# Patient Record
Sex: MALE | Race: OTHER | HISPANIC OR LATINO
[De-identification: names, ages, dates, MRNs, and addresses within clinical notes are randomized per-mention and may not be internally consistent; named-entity substitution may affect disease eponyms.]

---

## 2017-06-21 PROBLEM — Z00.00 ENCOUNTER FOR PREVENTIVE HEALTH EXAMINATION: Status: ACTIVE | Noted: 2017-06-21

## 2017-06-28 ENCOUNTER — APPOINTMENT (OUTPATIENT)
Dept: OPHTHALMOLOGY | Facility: CLINIC | Age: 72
End: 2017-06-28

## 2017-06-28 ENCOUNTER — OUTPATIENT (OUTPATIENT)
Dept: OUTPATIENT SERVICES | Facility: HOSPITAL | Age: 72
LOS: 1 days | End: 2017-06-28

## 2017-06-30 DIAGNOSIS — E11.3513 TYPE 2 DIABETES MELLITUS WITH PROLIFERATIVE DIABETIC RETINOPATHY WITH MACULAR EDEMA, BILATERAL: ICD-10-CM

## 2022-02-25 VITALS
HEIGHT: 69 IN | SYSTOLIC BLOOD PRESSURE: 176 MMHG | RESPIRATION RATE: 16 BRPM | OXYGEN SATURATION: 98 % | DIASTOLIC BLOOD PRESSURE: 81 MMHG | HEART RATE: 63 BPM | WEIGHT: 179.9 LBS | TEMPERATURE: 98 F

## 2022-02-25 RX ORDER — CHLORHEXIDINE GLUCONATE 213 G/1000ML
1 SOLUTION TOPICAL ONCE
Refills: 0 | Status: DISCONTINUED | OUTPATIENT
Start: 2022-03-03 | End: 2022-03-04

## 2022-02-25 NOTE — H&P ADULT - [facility] PE MLT D E PC
regular rate and rhythm/no murmur +MARCELLUS auscultated best at 2nd R intercostal space, III/XI/regular rate and rhythm

## 2022-02-25 NOTE — H&P ADULT - HISTORY OF PRESENT ILLNESS
COVID:  Pharmacy:  Escort:    76Y M, former smoker, w/ FHx of premature CAD/MI, and PMHx HTN, HLD, HFrEF (EF 30-35%), s/p PPM and DM-II, initially presented to outpt cardiologist Dr. Pickett c/o DUNN w/ 2-3 blocks that improves w/ rest and w/ associated LE edema. Pt also endorse brief episodes of palpitations and dizziness. He denies any ___ CP, syncope, diaphoresis, fatigue, orthopnea, PND, N/V, fever, chills or recent sick contact.     Echo (2/15/22): concentric LVH and dilatation, akinesis of apex, severe hypokinesis of mid-distal anterior septum and mod-severe global systolic dysfunction, LVEF 30-35%, mod LA dilatation, mild-mod MR, mild TR. Carotid US (__): patent carotid arteries B/L, left common carotid and bulb plaques noted, antegrade flow in B/L vertebral arteries. Aortic Duplex (__): mild to mod calcified plaque throughout aorta and common iliac, however patent and w/o stenosis, aneurysm or dissection. PVR (__): decreased left MAHOGANY, equivocal right MAHOGANY. EKG (__): SR, Qwave V1-4.    In light of pts risk factors, CCS class __ anginal symptoms and abnormal ____, pt now presents to [facility] for recommended cardiac catheterization with possible intervention if clinically indicated.  COVID:  Pharmacy:  Escort:    76Y M, former smoker, w/ FHx of premature CAD/MI, and PMHx HTN, HLD, HFrEF (EF 30-35%), s/p PPM and DM-II, initially presented to outpt cardiologist Dr. Pickett c/o DUNN w/ 2-3 blocks that improves w/ rest and w/ associated LE edema. Pt also endorse brief episodes of palpitations and dizziness. He denies any ___ CP, syncope, diaphoresis, fatigue, orthopnea, PND, N/V, fever, chills or recent sick contact.     Echo (2/15/22): LVEF 30-35% w/ akinesis of apex, severe hypokinesis of mid-distal anterior septum and mod-severe global dysfunction, mod LA dilatation, mild-mod MR, mild TR. Exercise Stress Echo (2/23/22): perinfarct ischemia at uriel-septal wall, LVEF 35% at rest and LVEF 38% at stress. EKG (2/4/22): SR, Qwave V1-4. Carotid US (per MD note): patent carotid arteries B/L, left common carotid and bulb plaques noted, antegrade flow in B/L vertebral arteries. Aortic Duplex (per MD note): mild to mod calcified plaque throughout aorta and common iliac, however patent and w/o stenosis, aneurysm or dissection. PVR (per MD note): decreased left MAHOGANY, equivocal right MAHOGANY.    In light of pts risk factors, CCS class __ anginal symptoms and abnormal stress echo, pt now presents to [facility] for recommended cardiac catheterization with possible intervention if clinically indicated.  COVID: 2/28/22- unsure of location- says his VICKI knows where they went   Pharmacy: [facility]   Escort: [facility] [phone]. (Pt's cell [phone])    Verify Meds    76Y Swazi speaking M, former smoker and PMHx HTN, HLD, HFrEF (EF 30-35%), s/p PPM, ?CVA in 2014 (residual R foot weakness), DM-II, initially presented to outpt cardiologist Dr. Pickett c/o DUNN w/ 2-3 blocks that improves w/ rest and w/ associated LE edema for the past 6 years. Pt also endorse brief episodes of palpitations and dizziness. He denies any CP, syncope, diaphoresis, fatigue, orthopnea, PND, N/V, fever, chills or recent sick contact. Echo (2/15/22): LVEF 30-35% w/ akinesis of apex, severe hypokinesis of mid-distal anterior septum and mod-severe global dysfunction, mod LA dilatation, mild-mod MR, mild TR. Exercise Stress Echo (2/23/22): perinfarct ischemia at uriel-septal wall, LVEF 35% at rest and LVEF 38% at stress. EKG (2/4/22): SR, Qwave V1-4. Carotid US (per MD note): patent carotid arteries B/L, left common carotid and bulb plaques noted, antegrade flow in B/L vertebral arteries. Aortic Duplex (per MD note): mild to mod calcified plaque throughout aorta and common iliac, however patent and w/o stenosis, aneurysm or dissection. PVR (per MD note): decreased left MAHOGANY, equivocal right MAHOGANY. In light of pts risk factors, CCS class II anginal symptoms and abnormal stress echo, pt now presents to [facility] for recommended cardiac catheterization with possible intervention if clinically indicated.  COVID: 2/28/22- negative (printed, filed in chart)  Pharmacy: [facility]   Escort: [facility] [phone]. (Pt's cell [phone])    76Y Chinese speaking M, former smoker and PMHx HTN, HLD, HFrEF (EF 30-35%), s/p PPM, ?CVA in 2014 (residual R foot weakness), DM-II, initially presented to outpt cardiologist Dr. Pickett c/o DUNN w/ 2-3 blocks that improves w/ rest and w/ associated LE edema for the past 6 years. Pt also endorse brief episodes of palpitations and dizziness. He denies any CP, syncope, diaphoresis, fatigue, orthopnea, PND, N/V, fever, chills or recent sick contact. Echo (2/15/22): LVEF 30-35% w/ akinesis of apex, severe hypokinesis of mid-distal anterior septum and mod-severe global dysfunction, mod LA dilatation, mild-mod MR, mild TR. Exercise Stress Echo (2/23/22): perinfarct ischemia at uriel-septal wall, LVEF 35% at rest and LVEF 38% at stress. EKG (2/4/22): SR, Qwave V1-4. Carotid US (per MD note): patent carotid arteries B/L, left common carotid and bulb plaques noted, antegrade flow in B/L vertebral arteries. Aortic Duplex (per MD note): mild to mod calcified plaque throughout aorta and common iliac, however patent and w/o stenosis, aneurysm or dissection. PVR (per MD note): decreased left MAHOGANY, equivocal right MAHOGANY. In light of pts risk factors, CCS class II anginal symptoms and abnormal stress echo, pt now presents to [facility] for recommended cardiac catheterization with possible intervention if clinically indicated.  COVID: 2/28/22- negative (printed, filed in chart)  Pharmacy: [facility]. Medications verified by patient's outpatient pharmacy and cross-referenced by [employer].   Escort: [facility] [phone]. (Pt's cell [phone])    76Y Ukrainian speaking M, former smoker and PMHx HTN, HLD, HFrEF (EF 30-35%), s/p PPM, ?CVA in 2014 (residual R foot weakness), DM-II, initially presented to outpt cardiologist Dr. Pickett c/o DUNN w/ 2-3 blocks that improves w/ rest and w/ associated LE edema for the past 6 years. Pt also endorse brief episodes of palpitations and dizziness. He denies any CP, syncope, diaphoresis, fatigue, orthopnea, PND, N/V, fever, chills or recent sick contact. Echo (2/15/22): LVEF 30-35% w/ akinesis of apex, severe hypokinesis of mid-distal anterior septum and mod-severe global dysfunction, mod LA dilatation, mild-mod MR, mild TR. Exercise Stress Echo (2/23/22): perinfarct ischemia at uriel-septal wall, LVEF 35% at rest and LVEF 38% at stress. EKG (2/4/22): SR, Qwave V1-4. Carotid US (per MD note): patent carotid arteries B/L, left common carotid and bulb plaques noted, antegrade flow in B/L vertebral arteries. Aortic Duplex (per MD note): mild to mod calcified plaque throughout aorta and common iliac, however patent and w/o stenosis, aneurysm or dissection. PVR (per MD note): decreased left MAHOGANY, equivocal right MAHOGANY. In light of pts risk factors, CCS class II anginal symptoms and abnormal stress echo, pt now presents to [facility] for recommended cardiac catheterization with possible intervention if clinically indicated.

## 2022-02-25 NOTE — H&P ADULT - NSHPLABSRESULTS_GEN_ALL_CORE
EKG: 12.1   5.06  )-----------( 243      ( 03 Mar 2022 08:23 )             36.2       03-03    139  |  103  |  17  ----------------------------<  133<H>  3.6   |  24  |  1.29    Ca    9.5      03 Mar 2022 08:23        PT/INR - ( 03 Mar 2022 08:23 )   PT: 10.5 sec;   INR: 0.88          PTT - ( 03 Mar 2022 08:23 )  PTT:29.0 sec              EKG: 12.1   5.06  )-----------( 243      ( 03 Mar 2022 08:23 )             36.2       03-03    139  |  103  |  17  ----------------------------<  133<H>  3.6   |  24  |  1.29    Ca    9.5      03 Mar 2022 08:23        PT/INR - ( 03 Mar 2022 08:23 )   PT: 10.5 sec;   INR: 0.88          PTT - ( 03 Mar 2022 08:23 )  PTT:29.0 sec        ECG 3/3/22: NSR @ 64 BPM with 1st degree AV block w/ LAD, LAFB, poor R wave progression, septal Q waves        EKG:

## 2022-02-25 NOTE — H&P ADULT - NSICDXPASTMEDICALHX_GEN_ALL_CORE_FT
PAST MEDICAL HISTORY:  Diabetes mellitus     H/O: CVA (cerebrovascular accident)     Hyperlipidemia     Hypertension

## 2022-02-25 NOTE — H&P ADULT - ASSESSMENT
76Y Hong Konger speaking M, former smoker and PMHx HTN, HLD, HFrEF (EF 30-35%), s/p PPM, ?CVA in 2014 (residual R foot weakness), DM-II, initially presented to outpt cardiologist Dr. Pickett c/o DUNN w/ 2-3 blocks that improves w/ rest and w/ associated LE edema for the past 6 years. Pt also endorse brief episodes of palpitations and dizziness.  In light of pts risk factors, CCS class II anginal symptoms, abnormal stress echo, pt now presents to [facility] for recommended cardiac catheterization with possible intervention if clinically indicated.     ECHO revealing LVEF 30-35%.     ASA Class III    Mallampati Class III    Risks & benefits of procedure and alternative therapy have been explained to the patient including but not limited to: allergic reaction, bleeding with possible need for blood transfusion, infection, renal and vascular compromise, limb damage, arrhythmia, stroke, vessel dissection/perforation, Myocardial infarction, emergent CABG. Informed consent obtained and in chart.       Patient a candidate for sedation: Yes    Sedation Type: Moderate 76Y Gibraltarian speaking M, former smoker and PMHx HTN, HLD, HFrEF (EF 30-35%), s/p PPM, ?CVA in 2014 (residual R foot weakness), DM-II, initially presented to outpt cardiologist Dr. Pickett c/o DUNN w/ 2-3 blocks that improves w/ rest and w/ associated LE edema for the past 6 years. Pt also endorse brief episodes of palpitations and dizziness.  In light of pts risk factors, CCS class II anginal symptoms, abnormal stress echo, pt now presents to [facility] for recommended cardiac catheterization with possible intervention if clinically indicated.     ECHO revealing LVEF 30-35%.  Trace edema to B/L ankles. Saturating well on RA. Lungs CTA.  No pre-hydration fluids.     VERY POOR MEDICATION COMPLIANCE. Does not take ASA.  Takes Brilinta 60 mg PO BID per surescripts. Pt unsure.  Did not take any medication today. Will provide Ecotrin 325 mg PO x1 & Plavix 600 mg PO x1 pre-cath.     ASA Class III    Mallampati Class III    Risks & benefits of procedure and alternative therapy have been explained to the patient including but not limited to: allergic reaction, bleeding with possible need for blood transfusion, infection, renal and vascular compromise, limb damage, arrhythmia, stroke, vessel dissection/perforation, Myocardial infarction, emergent CABG. Informed consent obtained and in chart.       Patient a candidate for sedation: Yes    Sedation Type: Moderate 76Y Romanian speaking M, former smoker and PMHx HTN, HLD, HFrEF (EF 30-35%), s/p PPM, ?CVA in 2014 (residual R foot weakness), DM-II, initially presented to outpt cardiologist Dr. Pickett c/o DUNN w/ 2-3 blocks that improves w/ rest and w/ associated LE edema for the past 6 years. Pt also endorse brief episodes of palpitations and dizziness.  In light of pts risk factors, CCS class II anginal symptoms, abnormal stress echo, pt now presents to [facility] for recommended cardiac catheterization with possible intervention if clinically indicated.     ECHO revealing LVEF 30-35%.  Trace edema to B/L ankles. Saturating well on RA. Lungs CTA.  NS @ 50 cc/hr as d/w D. [place]. Will hydrate post-cath pending EDP.    VERY POOR MEDICATION COMPLIANCE. Does not take ASA.  Takes Brilinta 60 mg PO BID per [employer]. Pt unsure about meds.  Did not take any medication today. Will provide Ecotrin 325 mg PO x1 & Plavix 600 mg PO x1 pre-cath.     Previously prescribed insulin but patient states he has not taken it for >1 month and unable to explain why.     ASA Class III    Mallampati Class III    Risks & benefits of procedure and alternative therapy have been explained to the patient including but not limited to: allergic reaction, bleeding with possible need for blood transfusion, infection, renal and vascular compromise, limb damage, arrhythmia, stroke, vessel dissection/perforation, Myocardial infarction, emergent CABG. Informed consent obtained and in chart.       Patient a candidate for sedation: Yes    Sedation Type: Moderate 76Y Polish speaking M, former smoker and PMHx HTN, HLD, HFrEF (EF 30-35%), s/p PPM, ?CVA in 2014 (residual R foot weakness), DM-II, initially presented to outpt cardiologist Dr. Pickett c/o DUNN w/ 2-3 blocks that improves w/ rest and w/ associated LE edema for the past 6 years. Pt also endorse brief episodes of palpitations and dizziness.  In light of pts risk factors, CCS class II anginal symptoms, abnormal stress echo, pt now presents to [facility] for recommended cardiac catheterization with possible intervention if clinically indicated.     ECHO revealing LVEF 30-35%.  Trace edema to B/L ankles. Saturating well on RA. Lungs CTA.  NS @ 50 cc/hr as d/w D. [place]. Will hydrate post-cath pending EDP.    VERY POOR MEDICATION COMPLIANCE. Does not take ASA.  Takes Brilinta 60 mg PO BID per Sures[employer]s. Pt unsure about meds.  Did not take any medication today. Will provide Ecotrin 325 mg PO x1 & Plavix 600 mg PO x1 pre-cath 2/2 concern for medication on-compliance.     Previously prescribed insulin but patient states he has not taken it for >1 month and unable to explain why.     ASA Class III    Mallampati Class III    Risks & benefits of procedure and alternative therapy have been explained to the patient including but not limited to: allergic reaction, bleeding with possible need for blood transfusion, infection, renal and vascular compromise, limb damage, arrhythmia, stroke, vessel dissection/perforation, Myocardial infarction, emergent CABG. Informed consent obtained and in chart.       Patient a candidate for sedation: Yes    Sedation Type: Moderate

## 2022-03-03 ENCOUNTER — TRANSCRIPTION ENCOUNTER (OUTPATIENT)
Age: 77
End: 2022-03-03

## 2022-03-03 ENCOUNTER — INPATIENT (INPATIENT)
Facility: HOSPITAL | Age: 77
LOS: 0 days | Discharge: HOME | DRG: 247 | End: 2022-03-04
Attending: INTERNAL MEDICINE | Admitting: INTERNAL MEDICINE
Payer: MEDICARE

## 2022-03-03 DIAGNOSIS — Z95.0 PRESENCE OF CARDIAC PACEMAKER: Chronic | ICD-10-CM

## 2022-03-03 LAB
A1C WITH ESTIMATED AVERAGE GLUCOSE RESULT: 8.6 % — HIGH (ref 4–5.6)
ALBUMIN SERPL ELPH-MCNC: 4.1 G/DL — SIGNIFICANT CHANGE UP (ref 3.3–5)
ALP SERPL-CCNC: 67 U/L — SIGNIFICANT CHANGE UP (ref 40–120)
ALT FLD-CCNC: 15 U/L — SIGNIFICANT CHANGE UP (ref 10–45)
ANION GAP SERPL CALC-SCNC: 12 MMOL/L — SIGNIFICANT CHANGE UP (ref 5–17)
APTT BLD: 29 SEC — SIGNIFICANT CHANGE UP (ref 27.5–35.5)
AST SERPL-CCNC: 19 U/L — SIGNIFICANT CHANGE UP (ref 10–40)
BASOPHILS # BLD AUTO: 0.03 K/UL — SIGNIFICANT CHANGE UP (ref 0–0.2)
BASOPHILS NFR BLD AUTO: 0.6 % — SIGNIFICANT CHANGE UP (ref 0–2)
BILIRUB SERPL-MCNC: 0.4 MG/DL — SIGNIFICANT CHANGE UP (ref 0.2–1.2)
BUN SERPL-MCNC: 17 MG/DL — SIGNIFICANT CHANGE UP (ref 7–23)
CALCIUM SERPL-MCNC: 9.5 MG/DL — SIGNIFICANT CHANGE UP (ref 8.4–10.5)
CHLORIDE SERPL-SCNC: 103 MMOL/L — SIGNIFICANT CHANGE UP (ref 96–108)
CHOLEST SERPL-MCNC: 271 MG/DL — HIGH
CK MB CFR SERPL CALC: 3.3 NG/ML — SIGNIFICANT CHANGE UP (ref 0–6.7)
CK SERPL-CCNC: 154 U/L — SIGNIFICANT CHANGE UP (ref 30–200)
CO2 SERPL-SCNC: 24 MMOL/L — SIGNIFICANT CHANGE UP (ref 22–31)
CREAT SERPL-MCNC: 1.29 MG/DL — SIGNIFICANT CHANGE UP (ref 0.5–1.3)
EGFR: 58 ML/MIN/1.73M2 — LOW
EOSINOPHIL # BLD AUTO: 0 K/UL — SIGNIFICANT CHANGE UP (ref 0–0.5)
EOSINOPHIL NFR BLD AUTO: 0 % — SIGNIFICANT CHANGE UP (ref 0–6)
ESTIMATED AVERAGE GLUCOSE: 200 MG/DL — HIGH (ref 68–114)
GLUCOSE BLDC GLUCOMTR-MCNC: 111 MG/DL — HIGH (ref 70–99)
GLUCOSE BLDC GLUCOMTR-MCNC: 123 MG/DL — HIGH (ref 70–99)
GLUCOSE BLDC GLUCOMTR-MCNC: 125 MG/DL — HIGH (ref 70–99)
GLUCOSE BLDC GLUCOMTR-MCNC: 130 MG/DL — HIGH (ref 70–99)
GLUCOSE BLDC GLUCOMTR-MCNC: 140 MG/DL — HIGH (ref 70–99)
GLUCOSE BLDC GLUCOMTR-MCNC: 141 MG/DL — HIGH (ref 70–99)
GLUCOSE BLDC GLUCOMTR-MCNC: 252 MG/DL — HIGH (ref 70–99)
GLUCOSE SERPL-MCNC: 133 MG/DL — HIGH (ref 70–99)
HCT VFR BLD CALC: 36.2 % — LOW (ref 39–50)
HCV AB S/CO SERPL IA: 0.04 S/CO — SIGNIFICANT CHANGE UP
HCV AB SERPL-IMP: SIGNIFICANT CHANGE UP
HDLC SERPL-MCNC: 49 MG/DL — SIGNIFICANT CHANGE UP
HGB BLD-MCNC: 12.1 G/DL — LOW (ref 13–17)
IMM GRANULOCYTES NFR BLD AUTO: 0.2 % — SIGNIFICANT CHANGE UP (ref 0–1.5)
INR BLD: 0.88 — SIGNIFICANT CHANGE UP (ref 0.88–1.16)
LIPID PNL WITH DIRECT LDL SERPL: 188 MG/DL — HIGH
LYMPHOCYTES # BLD AUTO: 1.64 K/UL — SIGNIFICANT CHANGE UP (ref 1–3.3)
LYMPHOCYTES # BLD AUTO: 32.4 % — SIGNIFICANT CHANGE UP (ref 13–44)
MCHC RBC-ENTMCNC: 32.7 PG — SIGNIFICANT CHANGE UP (ref 27–34)
MCHC RBC-ENTMCNC: 33.4 GM/DL — SIGNIFICANT CHANGE UP (ref 32–36)
MCV RBC AUTO: 97.8 FL — SIGNIFICANT CHANGE UP (ref 80–100)
MONOCYTES # BLD AUTO: 0.65 K/UL — SIGNIFICANT CHANGE UP (ref 0–0.9)
MONOCYTES NFR BLD AUTO: 12.8 % — SIGNIFICANT CHANGE UP (ref 2–14)
NEUTROPHILS # BLD AUTO: 2.73 K/UL — SIGNIFICANT CHANGE UP (ref 1.8–7.4)
NEUTROPHILS NFR BLD AUTO: 54 % — SIGNIFICANT CHANGE UP (ref 43–77)
NON HDL CHOLESTEROL: 222 MG/DL — HIGH
NRBC # BLD: 0 /100 WBCS — SIGNIFICANT CHANGE UP (ref 0–0)
PLATELET # BLD AUTO: 243 K/UL — SIGNIFICANT CHANGE UP (ref 150–400)
POTASSIUM SERPL-MCNC: 3.6 MMOL/L — SIGNIFICANT CHANGE UP (ref 3.5–5.3)
POTASSIUM SERPL-SCNC: 3.6 MMOL/L — SIGNIFICANT CHANGE UP (ref 3.5–5.3)
PROT SERPL-MCNC: 7 G/DL — SIGNIFICANT CHANGE UP (ref 6–8.3)
PROTHROM AB SERPL-ACNC: 10.5 SEC — SIGNIFICANT CHANGE UP (ref 10.5–13.4)
RBC # BLD: 3.7 M/UL — LOW (ref 4.2–5.8)
RBC # FLD: 12.9 % — SIGNIFICANT CHANGE UP (ref 10.3–14.5)
SODIUM SERPL-SCNC: 139 MMOL/L — SIGNIFICANT CHANGE UP (ref 135–145)
TRIGL SERPL-MCNC: 168 MG/DL — HIGH
WBC # BLD: 5.06 K/UL — SIGNIFICANT CHANGE UP (ref 3.8–10.5)
WBC # FLD AUTO: 5.06 K/UL — SIGNIFICANT CHANGE UP (ref 3.8–10.5)

## 2022-03-03 PROCEDURE — 92928 PRQ TCAT PLMT NTRAC ST 1 LES: CPT | Mod: LC

## 2022-03-03 PROCEDURE — 92978 ENDOLUMINL IVUS OCT C 1ST: CPT | Mod: 26,LC

## 2022-03-03 PROCEDURE — 99152 MOD SED SAME PHYS/QHP 5/>YRS: CPT

## 2022-03-03 RX ORDER — INSULIN ASPART 100 [IU]/ML
4 INJECTION, SOLUTION SUBCUTANEOUS
Qty: 0 | Refills: 0 | DISCHARGE

## 2022-03-03 RX ORDER — DEXTROSE 50 % IN WATER 50 %
15 SYRINGE (ML) INTRAVENOUS ONCE
Refills: 0 | Status: DISCONTINUED | OUTPATIENT
Start: 2022-03-03 | End: 2022-03-04

## 2022-03-03 RX ORDER — ASPIRIN/CALCIUM CARB/MAGNESIUM 324 MG
81 TABLET ORAL DAILY
Refills: 0 | Status: DISCONTINUED | OUTPATIENT
Start: 2022-03-04 | End: 2022-03-04

## 2022-03-03 RX ORDER — SPIRONOLACTONE 25 MG/1
12.5 TABLET, FILM COATED ORAL
Refills: 0 | Status: DISCONTINUED | OUTPATIENT
Start: 2022-03-04 | End: 2022-03-04

## 2022-03-03 RX ORDER — CARVEDILOL PHOSPHATE 80 MG/1
12.5 CAPSULE, EXTENDED RELEASE ORAL EVERY 12 HOURS
Refills: 0 | Status: DISCONTINUED | OUTPATIENT
Start: 2022-03-03 | End: 2022-03-04

## 2022-03-03 RX ORDER — SODIUM CHLORIDE 9 MG/ML
1000 INJECTION, SOLUTION INTRAVENOUS
Refills: 0 | Status: DISCONTINUED | OUTPATIENT
Start: 2022-03-03 | End: 2022-03-04

## 2022-03-03 RX ORDER — EMPAGLIFLOZIN 10 MG/1
1 TABLET, FILM COATED ORAL
Qty: 0 | Refills: 0 | DISCHARGE

## 2022-03-03 RX ORDER — SODIUM CHLORIDE 9 MG/ML
500 INJECTION INTRAMUSCULAR; INTRAVENOUS; SUBCUTANEOUS
Refills: 0 | Status: DISCONTINUED | OUTPATIENT
Start: 2022-03-03 | End: 2022-03-04

## 2022-03-03 RX ORDER — DEXTROSE 50 % IN WATER 50 %
12.5 SYRINGE (ML) INTRAVENOUS ONCE
Refills: 0 | Status: DISCONTINUED | OUTPATIENT
Start: 2022-03-03 | End: 2022-03-04

## 2022-03-03 RX ORDER — DEXTROSE 50 % IN WATER 50 %
25 SYRINGE (ML) INTRAVENOUS ONCE
Refills: 0 | Status: DISCONTINUED | OUTPATIENT
Start: 2022-03-03 | End: 2022-03-04

## 2022-03-03 RX ORDER — PREDNISOLONE SODIUM PHOSPHATE 1 %
1 DROPS OPHTHALMIC (EYE)
Qty: 0 | Refills: 0 | DISCHARGE

## 2022-03-03 RX ORDER — SACUBITRIL AND VALSARTAN 24; 26 MG/1; MG/1
1 TABLET, FILM COATED ORAL
Refills: 0 | Status: DISCONTINUED | OUTPATIENT
Start: 2022-03-03 | End: 2022-03-04

## 2022-03-03 RX ORDER — ASPIRIN/CALCIUM CARB/MAGNESIUM 324 MG
1 TABLET ORAL
Qty: 0 | Refills: 0 | DISCHARGE

## 2022-03-03 RX ORDER — INSULIN LISPRO 100/ML
VIAL (ML) SUBCUTANEOUS
Refills: 0 | Status: DISCONTINUED | OUTPATIENT
Start: 2022-03-03 | End: 2022-03-04

## 2022-03-03 RX ORDER — POTASSIUM CHLORIDE 20 MEQ
20 PACKET (EA) ORAL ONCE
Refills: 0 | Status: DISCONTINUED | OUTPATIENT
Start: 2022-03-03 | End: 2022-03-03

## 2022-03-03 RX ORDER — POTASSIUM CHLORIDE 20 MEQ
20 PACKET (EA) ORAL ONCE
Refills: 0 | Status: COMPLETED | OUTPATIENT
Start: 2022-03-03 | End: 2022-03-03

## 2022-03-03 RX ORDER — GLUCAGON INJECTION, SOLUTION 0.5 MG/.1ML
1 INJECTION, SOLUTION SUBCUTANEOUS ONCE
Refills: 0 | Status: DISCONTINUED | OUTPATIENT
Start: 2022-03-03 | End: 2022-03-04

## 2022-03-03 RX ORDER — METFORMIN HYDROCHLORIDE 850 MG/1
1 TABLET ORAL
Qty: 0 | Refills: 0 | DISCHARGE

## 2022-03-03 RX ORDER — SPIRONOLACTONE 25 MG/1
1 TABLET, FILM COATED ORAL
Qty: 0 | Refills: 0 | DISCHARGE

## 2022-03-03 RX ORDER — CLOPIDOGREL BISULFATE 75 MG/1
600 TABLET, FILM COATED ORAL ONCE
Refills: 0 | Status: COMPLETED | OUTPATIENT
Start: 2022-03-03 | End: 2022-03-03

## 2022-03-03 RX ORDER — SACUBITRIL AND VALSARTAN 24; 26 MG/1; MG/1
1 TABLET, FILM COATED ORAL
Qty: 0 | Refills: 0 | DISCHARGE

## 2022-03-03 RX ORDER — FUROSEMIDE 40 MG
1 TABLET ORAL
Qty: 0 | Refills: 0 | DISCHARGE

## 2022-03-03 RX ORDER — SODIUM CHLORIDE 9 MG/ML
500 INJECTION INTRAMUSCULAR; INTRAVENOUS; SUBCUTANEOUS
Refills: 0 | Status: DISCONTINUED | OUTPATIENT
Start: 2022-03-03 | End: 2022-03-03

## 2022-03-03 RX ORDER — ATORVASTATIN CALCIUM 80 MG/1
80 TABLET, FILM COATED ORAL AT BEDTIME
Refills: 0 | Status: DISCONTINUED | OUTPATIENT
Start: 2022-03-03 | End: 2022-03-04

## 2022-03-03 RX ORDER — ASPIRIN/CALCIUM CARB/MAGNESIUM 324 MG
325 TABLET ORAL ONCE
Refills: 0 | Status: COMPLETED | OUTPATIENT
Start: 2022-03-03 | End: 2022-03-03

## 2022-03-03 RX ORDER — INSULIN GLARGINE 100 [IU]/ML
20 INJECTION, SOLUTION SUBCUTANEOUS
Qty: 0 | Refills: 0 | DISCHARGE

## 2022-03-03 RX ORDER — KETOROLAC TROMETHAMINE 0.5 %
1 DROPS OPHTHALMIC (EYE)
Qty: 0 | Refills: 0 | DISCHARGE

## 2022-03-03 RX ORDER — GLIMEPIRIDE 1 MG
1 TABLET ORAL
Qty: 0 | Refills: 0 | DISCHARGE

## 2022-03-03 RX ORDER — CLOPIDOGREL BISULFATE 75 MG/1
75 TABLET, FILM COATED ORAL DAILY
Refills: 0 | Status: DISCONTINUED | OUTPATIENT
Start: 2022-03-04 | End: 2022-03-04

## 2022-03-03 RX ADMIN — Medication 20 MILLIEQUIVALENT(S): at 18:42

## 2022-03-03 RX ADMIN — Medication 325 MILLIGRAM(S): at 08:56

## 2022-03-03 RX ADMIN — CARVEDILOL PHOSPHATE 12.5 MILLIGRAM(S): 80 CAPSULE, EXTENDED RELEASE ORAL at 18:42

## 2022-03-03 RX ADMIN — SODIUM CHLORIDE 50 MILLILITER(S): 9 INJECTION INTRAMUSCULAR; INTRAVENOUS; SUBCUTANEOUS at 13:35

## 2022-03-03 RX ADMIN — SODIUM CHLORIDE 50 MILLILITER(S): 9 INJECTION INTRAMUSCULAR; INTRAVENOUS; SUBCUTANEOUS at 09:02

## 2022-03-03 RX ADMIN — SACUBITRIL AND VALSARTAN 1 TABLET(S): 24; 26 TABLET, FILM COATED ORAL at 18:42

## 2022-03-03 RX ADMIN — CLOPIDOGREL BISULFATE 600 MILLIGRAM(S): 75 TABLET, FILM COATED ORAL at 08:56

## 2022-03-03 RX ADMIN — ATORVASTATIN CALCIUM 80 MILLIGRAM(S): 80 TABLET, FILM COATED ORAL at 21:44

## 2022-03-03 NOTE — DISCHARGE NOTE PROVIDER - NSDCFUADDINST_GEN_ALL_CORE_FT
You underwent a cardiac catheterization and should wait 3 to 5 days before returning to normal activities. Do not drive for 2 days. Consult your doctor before returning to vigorous activity. You may return to work in 3-5 days. The catheter from your right wrist was removed.  Please avoid any heavy lifting (no more than 3 to 5 lbs) or strenuous activity for 5 days.      You may shower once the dressing is removed, but avoid baths, hot tubs, or swimming for 5 days to prevent infection. If you notice bleeding from the site, hardening and pain at the site, drainage or redness from the site, coolness/paleness of the right arm, weakness/paralysis of right arm (unable to lift or move) swelling, or fever, please call [phone]     A Mediterranean Diet is recommended! Some suggestions include continue incorporating 2 or more servings per day of vegetables, fruits, and whole grains. Increase intake of fish and legumes/beans to 2 or more servings per week. Aim to increase intake of healthy fats, such as olive oil and avocados, and have a handful of nuts/seeds most days. Reduce red/processed meat consumption to 2 or fewer times per week.

## 2022-03-03 NOTE — DISCHARGE NOTE PROVIDER - NSDCCPCAREPLAN_GEN_ALL_CORE_FT
PRINCIPAL DISCHARGE DIAGNOSIS  Diagnosis: CAD (coronary artery disease)  Assessment and Plan of Treatment: You had a cardiac angiogram with stent placement x 3 to one of your heart arteries (Left Circumflex).  DO NOT TAKE BRILINTA ANYMORE. You were started on Aspirin and Plavix. DO NOT STOP TAKING ASPIRIN OR PLAVIX UNLESS INSTRUCTED BY YOUR CARDIOLOGIST AS YOUR STENTS CAN CLOSE RESULTING IN HEART ATTACK AND DEATH. Follow-up with Dr. Pickett in 1 week.  "Our [facility] Cardiovascular Prevention Team from [facility] will contact to you to arrange an outpatient office visit (telehealth or in person). The goal of this service is to optimize medication (blood pressure, cholesterol, diabetes) and lifestyle (Diet, exercise, weight management, smoking) regimens to help lower the risk of cardiovascular events. They will work with your cardiologist to help support and guide you after your hospitalization."   We have provided you with a prescription for cardiac rehab which is medically supervised exercise program for your heart and has been shown to improve the quantity and quality of life of people with heart disease like yours. You should attend cardiac rehab 3 times per week for 12 weeks. We have provided you with a list of nearby facilities. Please call your insurance carrier to determine which of these facilities are covered under your plan. Please bring this prescription with you to your follow up appointment with your cardiologist who can then further assist you to enroll into a cardiac rehab program.         SECONDARY DISCHARGE DIAGNOSES  Diagnosis: Chronic systolic congestive heart failure  Assessment and Plan of Treatment: Your heart muscle is weak and does not pump normally. Your heart function is 30-35% and normal heart function is 55% and above. As a result you are at risk for fluid overload. Weigh yourself daily. If you notice a weight gain of 1-2 lbs in 1 day or 5 lbs in 1 week and/or shortness of breath or leg swelling contact your Cardiologist immediately and proceed to nearest Emergency Room. Continue Entresto, Carvedilol, and Spironolactone  RESTRICT YOUR DAILY FLUID INTAKE TO 1.5L -1500 mL (cc) daily.    Diagnosis: Hypertension  Assessment and Plan of Treatment: Maintain Low Salt Diet Less than 2000 mg-2 g daily    Diagnosis: Hyperlipidemia  Assessment and Plan of Treatment: Maintain Low Cholesterol Diet.  Continue Atorvastatin and Zetia.    Diagnosis: Diabetes mellitus, type 2  Assessment and Plan of Treatment: Monitor Fingersticks before meals and at bedtime. DO NOT TAKE METFORMIN UNTIL Paddy 3/6/22. NO BONITA ROSA MITCHELL.  This medication can interact with the contrast used during your procedure therefore we want to ensure the contrast has left your body prior to you restarting your Metformin. Contnue Jardiance and Glimepiride.     PRINCIPAL DISCHARGE DIAGNOSIS  Diagnosis: CAD (coronary artery disease)  Assessment and Plan of Treatment: You had a cardiac angiogram with stent placement x 3 to one of your heart arteries (Left Circumflex).  DO NOT TAKE BRILINTA ANYMORE. You were started on Aspirin and Plavix. DO NOT STOP TAKING ASPIRIN OR PLAVIX UNLESS INSTRUCTED BY YOUR CARDIOLOGIST AS YOUR STENTS CAN CLOSE RESULTING IN HEART ATTACK AND DEATH. Follow-up with Dr. Pickett in 1 week.  "Our [facility] Cardiovascular Prevention Team from [facility] will contact to you to arrange an outpatient office visit (telehealth or in person). The goal of this service is to optimize medication (blood pressure, cholesterol, diabetes) and lifestyle (Diet, exercise, weight management, smoking) regimens to help lower the risk of cardiovascular events. They will work with your cardiologist to help support and guide you after your hospitalization."   We have provided you with a prescription for cardiac rehab which is medically supervised exercise program for your heart and has been shown to improve the quantity and quality of life of people with heart disease like yours. You should attend cardiac rehab 3 times per week for 12 weeks. We have provided you with a list of nearby facilities. Please call your insurance carrier to determine which of these facilities are covered under your plan. Please bring this prescription with you to your follow up appointment with your cardiologist who can then further assist you to enroll into a cardiac rehab program.         SECONDARY DISCHARGE DIAGNOSES  Diagnosis: Chronic systolic congestive heart failure  Assessment and Plan of Treatment: Your heart muscle is weak and does not pump normally. Your heart function is 30-35% and normal heart function is 55% and above. As a result you are at risk for fluid overload. Weigh yourself daily. If you notice a weight gain of 1-2 lbs in 1 day or 5 lbs in 1 week and/or shortness of breath or leg swelling contact your Cardiologist immediately and proceed to nearest Emergency Room. Continue Entresto, Carvedilol, and Spironolactone  RESTRICT YOUR DAILY FLUID INTAKE TO 1.5L -1500 mL (cc) daily.    Diagnosis: Hypertension  Assessment and Plan of Treatment: Maintain Low Salt Diet Less than 2000 mg-2 g daily    Diagnosis: Hyperlipidemia  Assessment and Plan of Treatment: Maintain Low Cholesterol Diet.  Continue Atorvastatin and Zetia.    Diagnosis: Diabetes mellitus, type 2  Assessment and Plan of Treatment: Monitor Fingersticks before meals and at bedtime. DO NOT TAKE METFORMIN UNTIL Paddy 3/6/22. NO BONITA ROSA MITCHELL.  This medication can interact with the contrast used during your procedure therefore we want to ensure the contrast has left your body prior to you restarting your Metformin. Continue Jardiance and Glimepiride.     PRINCIPAL DISCHARGE DIAGNOSIS  Diagnosis: CAD (coronary artery disease)  Assessment and Plan of Treatment: You had a cardiac angiogram with stent placement x 3 to one of your heart arteries (Left Circumflex).  DO NOT TAKE BRILINTA ANYMORE. You were started on Aspirin and Plavix. DO NOT STOP TAKING ASPIRIN OR PLAVIX UNLESS INSTRUCTED BY YOUR CARDIOLOGIST AS YOUR STENTS CAN CLOSE RESULTING IN HEART ATTACK AND DEATH. Follow-up with Dr. Pickett in 1 week.  "Our [facility] Cardiovascular Prevention Team from [facility] will contact to you to arrange an outpatient office visit (telehealth or in person). The goal of this service is to optimize medication (blood pressure, cholesterol, diabetes) and lifestyle (Diet, exercise, weight management, smoking) regimens to help lower the risk of cardiovascular events. They will work with your cardiologist to help support and guide you after your hospitalization."   We have provided you with a prescription for cardiac rehab which is medically supervised exercise program for your heart and has been shown to improve the quantity and quality of life of people with heart disease like yours. You should attend cardiac rehab 3 times per week for 12 weeks. We have provided you with a list of nearby facilities. Please call your insurance carrier to determine which of these facilities are covered under your plan. Please bring this prescription with you to your follow up appointment with your cardiologist who can then further assist you to enroll into a cardiac rehab program.         SECONDARY DISCHARGE DIAGNOSES  Diagnosis: Chronic systolic congestive heart failure  Assessment and Plan of Treatment: Your heart muscle is weak and does not pump normally. Your heart function is 30-35% and normal heart function is 55% and above. As a result you are at risk for fluid overload. Weigh yourself daily. If you notice a weight gain of 1-2 lbs in 1 day or 5 lbs in 1 week and/or shortness of breath or leg swelling contact your Cardiologist immediately and proceed to nearest Emergency Room. Continue Entresto, Carvedilol, and Your Spironolactone was increased to 25mg daily.  RESTRICT YOUR DAILY FLUID INTAKE TO 1.5L -1500 mL (cc) daily.    Diagnosis: Hypertension  Assessment and Plan of Treatment: Maintain Low Salt Diet Less than 2000 mg-2 g daily    Diagnosis: Hyperlipidemia  Assessment and Plan of Treatment: Maintain Low Cholesterol Diet.  Continue Atorvastatin and Zetia.    Diagnosis: Diabetes mellitus, type 2  Assessment and Plan of Treatment: Monitor Fingersticks before meals and at bedtime. DO NOT TAKE METFORMIN UNTIL Paddy 3/6/22. NO BONITA MITCHELL.  This medication can interact with the contrast used during your procedure therefore we want to ensure the contrast has left your body prior to you restarting your Metformin. Continue Jardiance and Glimepiride.

## 2022-03-03 NOTE — DISCHARGE NOTE PROVIDER - CARE PROVIDER_API CALL
Jassi Quintero)  Cardiovascular Disease; Internal Medicine; Interventional Cardiology  [address] [place] [address]  Phone: [phone]  Fax: [phone]  Established Patient  Follow Up Time: 1 week

## 2022-03-03 NOTE — DISCHARGE NOTE PROVIDER - NSDCMRMEDTOKEN_GEN_ALL_CORE_FT
atorvastatin 80 mg oral tablet: 1 tab(s) orally once a day  Basaglar KwikPen 100 units/mL subcutaneous solution: 20 unit(s) subcutaneous 2 times a day (patient states he has not used this in &gt;1 month)  Brilinta (ticagrelor) 60 mg oral tablet: 1 tab(s) orally 2 times a day  Coreg 12.5 mg oral tablet: 1 tab(s) orally 2 times a day  Entresto 49 mg-51 mg oral tablet: 1 tab(s) orally 2 times a day  glimepiride 2 mg oral tablet: 1 tab(s) orally once a day  Jardiance 10 mg oral tablet: 1 tab(s) orally once a day (in the morning)  metFORMIN 1000 mg oral tablet: 1 tab(s) orally 2 times a day  NovoLOG 100 units/mL subcutaneous solution: 4 unit(s) subcutaneous 3 times a day (patient states he has not used this in &gt;1 month)  spironolactone 25 mg oral tablet: 0.5 tab(s) orally once a day  Zetia 10 mg oral tablet: 1 tab(s) orally once a day   aspirin 81 mg oral delayed release tablet: 1 tab(s) orally once a day  atorvastatin 80 mg oral tablet: 1 tab(s) orally once a day  Basaglar KwikPen 100 units/mL subcutaneous solution: 20 unit(s) subcutaneous 2 times a day (patient states he has not used this in &gt;1 month)  Cardiac Rehab : Dx: s/p PCI    Cardiac Rehab 3x a week x 12 weeks   clopidogrel 75 mg oral tablet: 1 tab(s) orally once a day  Coreg 12.5 mg oral tablet: 1 tab(s) orally 2 times a day  Entresto 49 mg-51 mg oral tablet: 1 tab(s) orally 2 times a day  glimepiride 2 mg oral tablet: 1 tab(s) orally once a day  Jardiance 10 mg oral tablet: 1 tab(s) orally once a day (in the morning)  metFORMIN 1000 mg oral tablet: 1 tab(s) orally 2 times a day  NovoLOG 100 units/mL subcutaneous solution: 4 unit(s) subcutaneous 3 times a day (patient states he has not used this in &gt;1 month)  spironolactone 25 mg oral tablet: 0.5 tab(s) orally once a day  Zetia 10 mg oral tablet: 1 tab(s) orally once a day   aspirin 81 mg oral delayed release tablet: 1 tab(s) orally once a day  atorvastatin 80 mg oral tablet: 1 tab(s) orally once a day  Basaglar KwikPen 100 units/mL subcutaneous solution: 20 unit(s) subcutaneous 2 times a day (patient states he has not used this in &gt;1 month)  Cardiac Rehab : Dx: s/p PCI    Cardiac Rehab 3x a week x 12 weeks   clopidogrel 75 mg oral tablet: 1 tab(s) orally once a day  Coreg 12.5 mg oral tablet: 1 tab(s) orally 2 times a day  Entresto 49 mg-51 mg oral tablet: 1 tab(s) orally 2 times a day  glimepiride 2 mg oral tablet: 1 tab(s) orally once a day  Jardiance 10 mg oral tablet: 1 tab(s) orally once a day (in the morning)  metFORMIN 1000 mg oral tablet: 1 tab(s) orally 2 times a day  NovoLOG 100 units/mL subcutaneous solution: 4 unit(s) subcutaneous 3 times a day (patient states he has not used this in &gt;1 month)  spironolactone 25 mg oral tablet: 1 tab(s) orally once a day   Zetia 10 mg oral tablet: 1 tab(s) orally once a day

## 2022-03-03 NOTE — PATIENT PROFILE ADULT - FALL HARM RISK - HARM RISK INTERVENTIONS
Assistance with ambulation/Assistance OOB with selected safe patient handling equipment/Communicate Risk of Fall with Harm to all staff/Discuss with provider need for PT consult/Monitor gait and stability/Provide patient with walking aids - walker, cane, crutches/Reinforce activity limits and safety measures with patient and family/Sit up slowly, dangle for a short time, stand at bedside before walking/Tailored Fall Risk Interventions/Use of alarms - bed, chair and/or voice tab/Visual Cue: Yellow wristband and red socks/Bed in lowest position, wheels locked, appropriate side rails in place/Call bell, personal items and telephone in reach/Instruct patient to call for assistance before getting out of bed or chair/Non-slip footwear when patient is out of bed/Slick to call system/Physically safe environment - no spills, clutter or unnecessary equipment/Purposeful Proactive Rounding/Room/bathroom lighting operational, light cord in reach

## 2022-03-03 NOTE — DISCHARGE NOTE PROVIDER - HOSPITAL COURSE
76Y Thai speaking M, former smoker and PMHx HTN, HLD, HFrEF (EF 30-35%), s/p PPM, ?CVA in 2014 (residual R foot weakness), DM-II, initially presented to outpt cardiologist Dr. Pickett c/o DUNN w/ 2-3 blocks that improves w/ rest and w/ associated LE edema for the past 6 years. Pt also endorse brief episodes of palpitations and dizziness. He denies any CP, syncope, diaphoresis, fatigue, orthopnea, PND, N/V, fever, chills or recent sick contact. Echo (2/15/22): LVEF 30-35% w/ akinesis of apex, severe hypokinesis of mid-distal anterior septum and mod-severe global dysfunction, mod LA dilatation, mild-mod MR, mild TR. Exercise Stress Echo (2/23/22): perinfarct ischemia at uriel-septal wall, LVEF 35% at rest and LVEF 38% at stress. EKG (2/4/22): SR, Qwave V1-4. In light of pts risk factors, CCS class II anginal symptoms and abnormal stress echo, pt now presented to [facility] for recommended cardiac catheterization with possible intervention if clinically indicated. Patient s/p cardiac cath 3/3/22: CARMENCITA x 3 prox/mid LCx (80-90%), Patent stents LAD proximal and mid, diffuse disease, normal LM, Patent stents proximal and mid RCA, LVEF 30-35% ECHO 2/15/22. LVEDP 7 mm Hg. Right Groin Perclose. Patient admitted to [facility] for post procedure monitoring. Patient seen and examined this AM and has no complaints. VSS. Patient voided and ambulated without issue. Labs and telemetry reviewed which are unremarkable Right radial -stable with no hematoma or bleed; radial pulse intact to baseline. Patient C/P free and HD stable and cleared for discharge by Dr. Vega.    Patient has been given appropriate discharge instructions including medication regimen access site management and follow up. All needed prescriptions have been e-prescribed to patients preferred outpatient pharmacy.   Discharge instructions reviewed with patient and patient verbalized understanding.    Cardiac Rehab (STEMI/NSTEMI/ACS/Unstable Angina/CHF/Post PCI):            *Education on benefits of Cardiac Rehab provided to patient: Yes         *Referral and Prescription Given for Cardiac Rehab : Yes         *Pt given list of locations & instructed to contact their insurance company to review list of participating providers    Statin Prescribed (STEMI/NSTEMI/ACS/UA &/OR Post PCI this admission):  Yes  DAPT: Prescriptions for Aspirin/Plavix/Brilinta/Effient e-prescribed to patient's pharmacy: Yes     76Y Arabic speaking M, former smoker and PMHx HTN, HLD, HFrEF (EF 30-35%), s/p PPM, ?CVA in 2014 (residual R foot weakness), DM-II, initially presented to outpt cardiologist Dr. Pickett c/o DUNN w/ 2-3 blocks that improves w/ rest and w/ associated LE edema for the past 6 years. Pt also endorse brief episodes of palpitations and dizziness. He denies any CP, syncope, diaphoresis, fatigue, orthopnea, PND, N/V, fever, chills or recent sick contact. Echo (2/15/22): LVEF 30-35% w/ akinesis of apex, severe hypokinesis of mid-distal anterior septum and mod-severe global dysfunction, mod LA dilatation, mild-mod MR, mild TR. Exercise Stress Echo (2/23/22): perinfarct ischemia at uriel-septal wall, LVEF 35% at rest and LVEF 38% at stress. EKG (2/4/22): SR, Qwave V1-4. In light of pts risk factors, CCS class II anginal symptoms and abnormal stress echo, pt now presented to [facility] for recommended cardiac catheterization with possible intervention if clinically indicated. Patient s/p cardiac cath 3/3/22: CARMENCITA x 3 prox/mid LCx (80-90%), Patent stents LAD proximal and mid, diffuse disease, normal LM, Patent stents proximal and mid RCA, LVEF 30-35% ECHO 2/15/22. LVEDP 7 mm Hg. Right Groin Perclose. Patient [facility] for post procedure monitoring. Patient seen and examined this AM and has no complaints. VSS. Patient voided and ambulated without issue. Labs and telemetry reviewed which are unremarkable Right radial -stable with no hematoma or bleed; radial pulse intact to baseline. Patient C/P free and HD stable and cleared for discharge by Dr. Vega. Patient has been given appropriate discharge instructions including medication regimen access site management and follow up. All needed prescriptions have been e-prescribed to patients preferred outpatient pharmacy.     Discharge instructions reviewed with patient and patient verbalized understanding.    Cardiac Rehab (STEMI/NSTEMI/ACS/Unstable Angina/CHF/Post PCI):            *Education on benefits of Cardiac Rehab provided to patient: Yes         *Referral and Prescription Given for Cardiac Rehab : Yes         *Pt given list of locations & instructed to contact their insurance company to review list of participating providers    Statin Prescribed (STEMI/NSTEMI/ACS/UA &/OR Post PCI this admission):  Yes  DAPT: Prescriptions for Aspirin/Plavix/Brilinta/Effient e-prescribed to patient's pharmacy: Yes     76Y Swedish speaking M, former smoker and PMHx HTN, HLD, HFrEF (EF 30-35%), s/p PPM, ?CVA in 2014 (residual R foot weakness), DM-II, initially presented to outpt cardiologist Dr. Pickett c/o DUNN w/ 2-3 blocks that improves w/ rest and w/ associated LE edema for the past 6 years. Pt also endorse brief episodes of palpitations and dizziness. He denies any CP, syncope, diaphoresis, fatigue, orthopnea, PND, N/V, fever, chills or recent sick contact. Echo (2/15/22): LVEF 30-35% w/ akinesis of apex, severe hypokinesis of mid-distal anterior septum and mod-severe global dysfunction, mod LA dilatation, mild-mod MR, mild TR. Exercise Stress Echo (2/23/22): perinfarct ischemia at uriel-septal wall, LVEF 35% at rest and LVEF 38% at stress. EKG (2/4/22): SR, Qwave V1-4. In light of pts risk factors, CCS class II anginal symptoms and abnormal stress echo, pt now presented to [facility] for recommended cardiac catheterization with possible intervention if clinically indicated. Patient s/p cardiac cath 3/3/22: CARMENCITA x 3 prox/mid LCx (80-90%), Patent stents LAD proximal and mid, diffuse disease, normal LM, Patent stents proximal and mid RCA, LVEF 30-35% ECHO 2/15/22. LVEDP 7 mm Hg. Right Groin Perclose. Patient admitted to [facility] for post procedure monitoring. Patient seen and examined this AM and has no complaints. VSS. Patient voided and ambulated without issue. Labs and telemetry reviewed which are unremarkable Right radial -stable with no hematoma or bleed; radial pulse intact to baseline. Patient C/P free and HD stable and cleared for discharge by Dr. Vega. Patient has been given appropriate discharge instructions including medication regimen access site management and follow up. All needed prescriptions have been e-prescribed to patients preferred outpatient pharmacy.     Discharge instructions reviewed with patient and patient verbalized understanding.    Cardiac Rehab (STEMI/NSTEMI/ACS/Unstable Angina/CHF/Post PCI):            *Education on benefits of Cardiac Rehab provided to patient: Yes         *Referral and Prescription Given for Cardiac Rehab : Yes         *Pt given list of locations & instructed to contact their insurance company to review list of participating providers    Statin Prescribed (STEMI/NSTEMI/ACS/UA &/OR Post PCI this admission):  Yes  DAPT: Prescriptions for Aspirin/Plavix/Brilinta/Effient e-prescribed to patient's pharmacy: Yes

## 2022-03-04 ENCOUNTER — TRANSCRIPTION ENCOUNTER (OUTPATIENT)
Age: 77
End: 2022-03-04

## 2022-03-04 VITALS — TEMPERATURE: 98 F

## 2022-03-04 LAB
ALBUMIN SERPL ELPH-MCNC: 3.8 G/DL — SIGNIFICANT CHANGE UP (ref 3.3–5)
ALP SERPL-CCNC: 66 U/L — SIGNIFICANT CHANGE UP (ref 40–120)
ALT FLD-CCNC: 21 U/L — SIGNIFICANT CHANGE UP (ref 10–45)
ANION GAP SERPL CALC-SCNC: 14 MMOL/L — SIGNIFICANT CHANGE UP (ref 5–17)
AST SERPL-CCNC: 109 U/L — HIGH (ref 10–40)
BASOPHILS # BLD AUTO: 0.02 K/UL — SIGNIFICANT CHANGE UP (ref 0–0.2)
BASOPHILS NFR BLD AUTO: 0.3 % — SIGNIFICANT CHANGE UP (ref 0–2)
BILIRUB SERPL-MCNC: 0.3 MG/DL — SIGNIFICANT CHANGE UP (ref 0.2–1.2)
BUN SERPL-MCNC: 19 MG/DL — SIGNIFICANT CHANGE UP (ref 7–23)
CALCIUM SERPL-MCNC: 9.4 MG/DL — SIGNIFICANT CHANGE UP (ref 8.4–10.5)
CHLORIDE SERPL-SCNC: 104 MMOL/L — SIGNIFICANT CHANGE UP (ref 96–108)
CO2 SERPL-SCNC: 20 MMOL/L — LOW (ref 22–31)
CREAT SERPL-MCNC: 1.31 MG/DL — HIGH (ref 0.5–1.3)
EGFR: 57 ML/MIN/1.73M2 — LOW
EOSINOPHIL # BLD AUTO: 0 K/UL — SIGNIFICANT CHANGE UP (ref 0–0.5)
EOSINOPHIL NFR BLD AUTO: 0 % — SIGNIFICANT CHANGE UP (ref 0–6)
GLUCOSE BLDC GLUCOMTR-MCNC: 139 MG/DL — HIGH (ref 70–99)
GLUCOSE SERPL-MCNC: 135 MG/DL — HIGH (ref 70–99)
HCT VFR BLD CALC: 36.3 % — LOW (ref 39–50)
HGB BLD-MCNC: 12.6 G/DL — LOW (ref 13–17)
IMM GRANULOCYTES NFR BLD AUTO: 0.3 % — SIGNIFICANT CHANGE UP (ref 0–1.5)
LYMPHOCYTES # BLD AUTO: 1.35 K/UL — SIGNIFICANT CHANGE UP (ref 1–3.3)
LYMPHOCYTES # BLD AUTO: 21.1 % — SIGNIFICANT CHANGE UP (ref 13–44)
MAGNESIUM SERPL-MCNC: 2 MG/DL — SIGNIFICANT CHANGE UP (ref 1.6–2.6)
MCHC RBC-ENTMCNC: 33.8 PG — SIGNIFICANT CHANGE UP (ref 27–34)
MCHC RBC-ENTMCNC: 34.7 GM/DL — SIGNIFICANT CHANGE UP (ref 32–36)
MCV RBC AUTO: 97.3 FL — SIGNIFICANT CHANGE UP (ref 80–100)
MONOCYTES # BLD AUTO: 0.73 K/UL — SIGNIFICANT CHANGE UP (ref 0–0.9)
MONOCYTES NFR BLD AUTO: 11.4 % — SIGNIFICANT CHANGE UP (ref 2–14)
NEUTROPHILS # BLD AUTO: 4.28 K/UL — SIGNIFICANT CHANGE UP (ref 1.8–7.4)
NEUTROPHILS NFR BLD AUTO: 66.9 % — SIGNIFICANT CHANGE UP (ref 43–77)
NRBC # BLD: 0 /100 WBCS — SIGNIFICANT CHANGE UP (ref 0–0)
PLATELET # BLD AUTO: 246 K/UL — SIGNIFICANT CHANGE UP (ref 150–400)
POTASSIUM SERPL-MCNC: 4 MMOL/L — SIGNIFICANT CHANGE UP (ref 3.5–5.3)
POTASSIUM SERPL-SCNC: 4 MMOL/L — SIGNIFICANT CHANGE UP (ref 3.5–5.3)
PROT SERPL-MCNC: 6.6 G/DL — SIGNIFICANT CHANGE UP (ref 6–8.3)
RBC # BLD: 3.73 M/UL — LOW (ref 4.2–5.8)
RBC # FLD: 13.1 % — SIGNIFICANT CHANGE UP (ref 10.3–14.5)
SODIUM SERPL-SCNC: 138 MMOL/L — SIGNIFICANT CHANGE UP (ref 135–145)
WBC # BLD: 6.4 K/UL — SIGNIFICANT CHANGE UP (ref 3.8–10.5)
WBC # FLD AUTO: 6.4 K/UL — SIGNIFICANT CHANGE UP (ref 3.8–10.5)

## 2022-03-04 PROCEDURE — 85025 COMPLETE CBC W/AUTO DIFF WBC: CPT

## 2022-03-04 PROCEDURE — 82553 CREATINE MB FRACTION: CPT

## 2022-03-04 PROCEDURE — C1725: CPT

## 2022-03-04 PROCEDURE — 99239 HOSP IP/OBS DSCHRG MGMT >30: CPT

## 2022-03-04 PROCEDURE — 82962 GLUCOSE BLOOD TEST: CPT

## 2022-03-04 PROCEDURE — C1874: CPT

## 2022-03-04 PROCEDURE — 82550 ASSAY OF CK (CPK): CPT

## 2022-03-04 PROCEDURE — C1887: CPT

## 2022-03-04 PROCEDURE — 83735 ASSAY OF MAGNESIUM: CPT

## 2022-03-04 PROCEDURE — 80061 LIPID PANEL: CPT

## 2022-03-04 PROCEDURE — C1753: CPT

## 2022-03-04 PROCEDURE — 83036 HEMOGLOBIN GLYCOSYLATED A1C: CPT

## 2022-03-04 PROCEDURE — 86803 HEPATITIS C AB TEST: CPT

## 2022-03-04 PROCEDURE — 36415 COLL VENOUS BLD VENIPUNCTURE: CPT

## 2022-03-04 PROCEDURE — 85610 PROTHROMBIN TIME: CPT

## 2022-03-04 PROCEDURE — C1769: CPT

## 2022-03-04 PROCEDURE — C1894: CPT

## 2022-03-04 PROCEDURE — 85730 THROMBOPLASTIN TIME PARTIAL: CPT

## 2022-03-04 PROCEDURE — 80053 COMPREHEN METABOLIC PANEL: CPT

## 2022-03-04 PROCEDURE — C1760: CPT

## 2022-03-04 RX ORDER — CLOPIDOGREL BISULFATE 75 MG/1
1 TABLET, FILM COATED ORAL
Qty: 30 | Refills: 11
Start: 2022-03-04 | End: 2023-02-26

## 2022-03-04 RX ORDER — CARVEDILOL PHOSPHATE 80 MG/1
1 CAPSULE, EXTENDED RELEASE ORAL
Qty: 0 | Refills: 0 | DISCHARGE

## 2022-03-04 RX ORDER — SPIRONOLACTONE 25 MG/1
0.5 TABLET, FILM COATED ORAL
Qty: 0 | Refills: 0 | DISCHARGE

## 2022-03-04 RX ORDER — TICAGRELOR 90 MG/1
1 TABLET ORAL
Qty: 0 | Refills: 0 | DISCHARGE

## 2022-03-04 RX ORDER — ATORVASTATIN CALCIUM 80 MG/1
1 TABLET, FILM COATED ORAL
Qty: 0 | Refills: 0 | DISCHARGE

## 2022-03-04 RX ORDER — SPIRONOLACTONE 25 MG/1
1 TABLET, FILM COATED ORAL
Qty: 30 | Refills: 3
Start: 2022-03-04 | End: 2022-07-01

## 2022-03-04 RX ORDER — ASPIRIN/CALCIUM CARB/MAGNESIUM 324 MG
1 TABLET ORAL
Qty: 30 | Refills: 11
Start: 2022-03-04 | End: 2023-02-26

## 2022-03-04 RX ORDER — EZETIMIBE 10 MG/1
1 TABLET ORAL
Qty: 30 | Refills: 3
Start: 2022-03-04 | End: 2022-07-01

## 2022-03-04 RX ORDER — ATORVASTATIN CALCIUM 80 MG/1
1 TABLET, FILM COATED ORAL
Qty: 30 | Refills: 3
Start: 2022-03-04 | End: 2022-07-01

## 2022-03-04 RX ORDER — CARVEDILOL PHOSPHATE 80 MG/1
1 CAPSULE, EXTENDED RELEASE ORAL
Qty: 60 | Refills: 3
Start: 2022-03-04 | End: 2022-07-01

## 2022-03-04 RX ORDER — EZETIMIBE 10 MG/1
1 TABLET ORAL
Qty: 0 | Refills: 0 | DISCHARGE

## 2022-03-04 RX ADMIN — SACUBITRIL AND VALSARTAN 1 TABLET(S): 24; 26 TABLET, FILM COATED ORAL at 06:20

## 2022-03-04 RX ADMIN — CARVEDILOL PHOSPHATE 12.5 MILLIGRAM(S): 80 CAPSULE, EXTENDED RELEASE ORAL at 06:20

## 2022-03-04 RX ADMIN — CLOPIDOGREL BISULFATE 75 MILLIGRAM(S): 75 TABLET, FILM COATED ORAL at 10:27

## 2022-03-04 RX ADMIN — Medication 81 MILLIGRAM(S): at 10:27

## 2022-03-04 RX ADMIN — SPIRONOLACTONE 12.5 MILLIGRAM(S): 25 TABLET, FILM COATED ORAL at 10:27

## 2022-03-04 NOTE — DISCHARGE NOTE NURSING/CASE MANAGEMENT/SOCIAL WORK - PATIENT PORTAL LINK FT
You can access the FollowMyHealth Patient Portal offered by [facility] by registering at the following website: [email or web address]. By joining [employer]’s FollowMyHealth portal, you will also be able to view your health information using other applications (apps) compatible with our system.

## 2022-03-04 NOTE — DISCHARGE NOTE NURSING/CASE MANAGEMENT/SOCIAL WORK - NSDCPEFALRISK_GEN_ALL_CORE
For information on Fall & Injury Prevention, visit: [email or web address] OR  [email or web address] OR  https://www.cdc.gov/steadi/patient.html

## 2022-03-07 PROBLEM — Z86.73 PERSONAL HISTORY OF TRANSIENT ISCHEMIC ATTACK (TIA), AND CEREBRAL INFARCTION WITHOUT RESIDUAL DEFICITS: Chronic | Status: ACTIVE | Noted: 2022-03-01

## 2022-03-07 PROBLEM — E11.9 TYPE 2 DIABETES MELLITUS WITHOUT COMPLICATIONS: Chronic | Status: ACTIVE | Noted: 2022-03-01

## 2022-03-07 PROBLEM — I10 ESSENTIAL (PRIMARY) HYPERTENSION: Chronic | Status: ACTIVE | Noted: 2022-03-01

## 2022-03-07 PROBLEM — E78.5 HYPERLIPIDEMIA, UNSPECIFIED: Chronic | Status: ACTIVE | Noted: 2022-03-01

## 2022-03-08 DIAGNOSIS — I50.22 CHRONIC SYSTOLIC (CONGESTIVE) HEART FAILURE: ICD-10-CM

## 2022-03-08 DIAGNOSIS — Z87.891 PERSONAL HISTORY OF NICOTINE DEPENDENCE: ICD-10-CM

## 2022-03-08 DIAGNOSIS — Z95.5 PRESENCE OF CORONARY ANGIOPLASTY IMPLANT AND GRAFT: ICD-10-CM

## 2022-03-08 DIAGNOSIS — I11.0 HYPERTENSIVE HEART DISEASE WITH HEART FAILURE: ICD-10-CM

## 2022-03-08 DIAGNOSIS — I25.119 ATHEROSCLEROTIC HEART DISEASE OF NATIVE CORONARY ARTERY WITH UNSPECIFIED ANGINA PECTORIS: ICD-10-CM

## 2022-03-08 DIAGNOSIS — I70.0 ATHEROSCLEROSIS OF AORTA: ICD-10-CM

## 2022-03-08 DIAGNOSIS — Z95.0 PRESENCE OF CARDIAC PACEMAKER: ICD-10-CM

## 2022-03-08 DIAGNOSIS — E78.5 HYPERLIPIDEMIA, UNSPECIFIED: ICD-10-CM

## 2022-03-08 DIAGNOSIS — R29.898 OTHER SYMPTOMS AND SIGNS INVOLVING THE MUSCULOSKELETAL SYSTEM: ICD-10-CM

## 2022-03-08 DIAGNOSIS — I25.84 CORONARY ATHEROSCLEROSIS DUE TO CALCIFIED CORONARY LESION: ICD-10-CM

## 2022-03-08 DIAGNOSIS — E11.9 TYPE 2 DIABETES MELLITUS WITHOUT COMPLICATIONS: ICD-10-CM

## 2022-03-08 DIAGNOSIS — I34.0 NONRHEUMATIC MITRAL (VALVE) INSUFFICIENCY: ICD-10-CM

## 2022-03-16 ENCOUNTER — APPOINTMENT (OUTPATIENT)
Dept: HEART AND VASCULAR | Facility: CLINIC | Age: 77
End: 2022-03-16
Payer: MEDICARE

## 2022-03-16 DIAGNOSIS — I10 ESSENTIAL (PRIMARY) HYPERTENSION: ICD-10-CM

## 2022-03-16 DIAGNOSIS — I25.10 ATHEROSCLEROTIC HEART DISEASE OF NATIVE CORONARY ARTERY W/OUT ANGINA PECTORIS: ICD-10-CM

## 2022-03-16 DIAGNOSIS — E78.5 HYPERLIPIDEMIA, UNSPECIFIED: ICD-10-CM

## 2022-03-16 PROCEDURE — 99443: CPT

## 2022-09-12 ENCOUNTER — APPOINTMENT (OUTPATIENT)
Dept: HEART AND VASCULAR | Facility: CLINIC | Age: 77
End: 2022-09-12

## 2022-09-12 VITALS
DIASTOLIC BLOOD PRESSURE: 83 MMHG | HEART RATE: 64 BPM | SYSTOLIC BLOOD PRESSURE: 174 MMHG | TEMPERATURE: 95.7 F | WEIGHT: 189 LBS

## 2022-09-12 NOTE — REASON FOR VISIT
[New Patient Device Check] : is here today for a new patient device check visit for [FreeTextEntry1] :  # [id number]\par \par 77M with ICM (30-35%) here for iniitial device check

## 2022-09-12 NOTE — DISCUSSION/SUMMARY
[FreeTextEntry1] : 77M with ICM (EF 30-35%) s/p BS single chamber ICD (2019), CAD s/p multiple PCI who presents for initial visit for device interrogation. His device is functioning normally and he has had no events. He has no complaints at this time. He should follow up in 6 months. We will provide him with a home monitor at that time.

## 2022-09-12 NOTE — PHYSICAL EXAM
[Well Developed] : well developed [Well Nourished] : well nourished [No Acute Distress] : no acute distress [Normal S1, S2] : normal S1, S2 [No Murmur] : no murmur [No Rub] : no rub [Clear Lung Fields] : clear lung fields [Good Air Entry] : good air entry [No Respiratory Distress] : no respiratory distress  [Soft] : abdomen soft [Non Tender] : non-tender [Normal Gait] : normal gait [No Edema] : no edema [No Cyanosis] : no cyanosis [No Clubbing] : no clubbing [Alert and Oriented] : alert and oriented

## 2022-09-12 NOTE — REASON FOR VISIT
[New Patient Device Check] : is here today for a new patient device check visit for [FreeTextEntry1] :  # [id number]\par \par 77M with ICM (30-35%) here for iniitial device check

## 2022-09-12 NOTE — HISTORY OF PRESENT ILLNESS
[FreeTextEntry1] : 77M with ICM (EF 30-35%) s/p BS single chamber ICD (2019), CAD s/p multiple PCI who presents for initial visit for device interrogation. He is doing well with no complaints at this time. He was recently admitted with anginal type symptoms and at that time underwent LHC during which he received CARMENCITA x3 LCx. He has been doing well since then. He is tolerating his medications. He has had no events on his device. He has not had his device interrogated since implant.

## 2022-09-12 NOTE — PROCEDURE
[No] : not [NSR] : normal sinus rhythm [ICD] : Implantable cardioverter-defibrillator [VVI] : VVI [Threshold Testing Performed] : Threshold testing was performed [Lead Imp:  ___ohms] : lead impedance was [unfilled] ohms [Sensing Amplitude ___mv] : sensing amplitude was [unfilled] mv [___V @] : [unfilled] V [___ ms] : [unfilled] ms [de-identified] : [facility] [de-identified] : Resonate EL [de-identified] : [id number] [de-identified] : 03/30/2019 [de-identified] : 42 [de-identified] : 14.5 years [de-identified] : 0% pacing\par No events

## 2023-03-13 ENCOUNTER — APPOINTMENT (OUTPATIENT)
Dept: HEART AND VASCULAR | Facility: CLINIC | Age: 78
End: 2023-03-13

## 2025-02-14 NOTE — DISCHARGE NOTE NURSING/CASE MANAGEMENT/SOCIAL WORK - FLU SEASON?
Yes... [No Acute Distress] : no acute distress [Normal Oropharynx] : normal oropharynx [Normal Appearance] : normal appearance [No Neck Mass] : no neck mass [Normal Rate/Rhythm] : normal rate/rhythm [Normal S1, S2] : normal s1, s2 [No Murmurs] : no murmurs [No Resp Distress] : no resp distress [Clear to Auscultation Bilaterally] : clear to auscultation bilaterally [Normal Gait] : normal gait [No Clubbing] : no clubbing [No Cyanosis] : no cyanosis [No Edema] : no edema [FROM] : FROM [Normal Color/ Pigmentation] : normal color/ pigmentation [No Focal Deficits] : no focal deficits [Oriented x3] : oriented x3 [Normal Affect] : normal affect